# Patient Record
Sex: MALE | Race: WHITE | NOT HISPANIC OR LATINO | Employment: OTHER | URBAN - METROPOLITAN AREA
[De-identification: names, ages, dates, MRNs, and addresses within clinical notes are randomized per-mention and may not be internally consistent; named-entity substitution may affect disease eponyms.]

---

## 2018-09-11 DIAGNOSIS — I65.23 CAROTID STENOSIS, BILATERAL: Primary | ICD-10-CM

## 2018-09-17 ENCOUNTER — OFFICE VISIT (OUTPATIENT)
Dept: CARDIAC SURGERY | Facility: CLINIC | Age: 78
End: 2018-09-17

## 2018-09-17 VITALS
HEIGHT: 68 IN | TEMPERATURE: 98 F | BODY MASS INDEX: 30.8 KG/M2 | OXYGEN SATURATION: 98 % | HEART RATE: 65 BPM | SYSTOLIC BLOOD PRESSURE: 145 MMHG | DIASTOLIC BLOOD PRESSURE: 78 MMHG | WEIGHT: 203.2 LBS

## 2018-09-17 DIAGNOSIS — I72.8 ANEURYSM OF OTHER SPECIFIED ARTERIES (CODE): Primary | ICD-10-CM

## 2018-09-17 PROCEDURE — 99205 OFFICE O/P NEW HI 60 MIN: CPT | Performed by: THORACIC SURGERY (CARDIOTHORACIC VASCULAR SURGERY)

## 2018-09-17 RX ORDER — LEVOTHYROXINE SODIUM 0.05 MG/1
50 TABLET ORAL DAILY
COMMUNITY

## 2018-09-17 NOTE — PROGRESS NOTES
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  He had about 65% right internal carotid artery stenosis.  He denies any chest pain or neck pain.  He is on aspirin daily.  He has been followed for his abdominal aneurysm in [place].  He denies any abdominal pain.  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     • GI bleeding     Colonic bleeding, 2014, requiring cauterization.   • Hiatal hernia    • History of blood transfusion    • Hyperlipidemia    • Hypertension    • Ischemic heart disease    • Rheumatoid arthritis (CMS/HCC)      Past Surgical History:   Procedure Laterality Date   • BACK SURGERY     • CORONARY ARTERY BYPASS GRAFT      x4   • ENDOSCOPY  12/14/2009   • INGUINAL HERNIA REPAIR     • PENILE PROSTHESIS IMPLANT         Current Outpatient Prescriptions:   •  ALPRAZolam (XANAX) 1 MG tablet, take 1/2-1 tablet by mouth once daily if needed, Disp: , Rfl: 0  •   aspirin 81 MG EC tablet, Take 81 mg by mouth Daily., Disp: , Rfl:   •  Cholecalciferol (VITAMIN D3) 5000 UNITS capsule capsule, Take 5,000 Units by mouth Daily., Disp: , Rfl:   •  Coenzyme Q10 (CO Q-10) 200 MG capsule, Take 200 mg by mouth Daily., Disp: , Rfl:   •  fluticasone (FLONASE) 50 MCG/ACT nasal spray, instill 1 to 2 sprays into each nostril once daily, Disp: , Rfl: 0  •  glipiZIDE (GLUCOTROL) 2.5 MG 24 hr tablet, 2.5 mg Daily., Disp: , Rfl:   •  HYDROcodone-acetaminophen (NORCO)  MG per tablet, 1 tablet 2 (Two) Times a Day., Disp: , Rfl: 0  •  isosorbide mononitrate (IMDUR) 30 MG 24 hr tablet, Take 1 tablet by mouth Daily., Disp: 30 tablet, Rfl: 11  •  levothyroxine (SYNTHROID, LEVOTHROID) 50 MCG tablet, Take 50 mcg by mouth Daily., Disp: , Rfl:   •  lisinopril (PRINIVIL,ZESTRIL) 5 MG tablet, 5 mg Daily., Disp: , Rfl: 0  •  metFORMIN (GLUCOPHAGE) 1000 MG tablet, 1,000 mg 2 (Two) Times a Day., Disp: , Rfl: 0  •  predniSONE (DELTASONE) 2.5 MG tablet, 2.5 mg Daily., Disp: , Rfl: 0  •  raNITIdine (ZANTAC) 300 MG tablet, 300 mg Daily., Disp: , Rfl: 0  •  vitamin B-12 (CYANOCOBALAMIN) 1000 MCG tablet, Take 1,000 mcg by mouth Daily., Disp: , Rfl:   •  azelastine (ASTELIN) 0.1 % nasal spray, 2 sprays 2 (Two) Times a Day., Disp: , Rfl: 0  •  Bioflavonoid Products (OMARI C PO), Take 500 mg by mouth Daily., Disp: , Rfl:   •  Magnesium Gluconate (MAGNESIUM 27 PO), Take  by mouth Daily., Disp: , Rfl:   •  montelukast (SINGULAIR) 10 MG tablet, 10 mg Daily., Disp: , Rfl: 0  •  nitroglycerin (NITROSTAT) 0.4 MG SL tablet, 1 under the tongue as needed for angina, may repeat q5mins for up three doses, Disp: 100 tablet, Rfl: 11  •  omeprazole (priLOSEC) 20 MG capsule, take 1 capsule by mouth once daily, Disp: , Rfl: 0  •  POTASSIUM GLUCONATE PO, Take  by mouth Daily., Disp: , Rfl:   •  Saw Palmetto, Serenoa repens, 450 MG capsule, Take  by mouth Daily., Disp: , Rfl:   Allergies   Allergen Reactions   • Coreg [Carvedilol]  Other (See Comments)     dysphagia/throat swelling.  PATIENT DENIES ALLERGY     Social History     Social History   • Marital status: Single     Spouse name: N/A   • Number of children: N/A   • Years of education: N/A     Occupational History   • Not on file.     Social History Main Topics   • Smoking status: Former Smoker     Packs/day: 1.50     Years: 25.00     Types: Cigarettes     Quit date: 1/1/1975   • Smokeless tobacco: Current User     Types: Snuff   • Alcohol use 1.8 - 3.6 oz/week     1 - 2 Glasses of wine, 1 - 2 Cans of beer, 1 - 2 Shots of liquor per week      Comment: monthly   • Drug use: No   • Sexual activity: Defer     Other Topics Concern   • Not on file     Social History Narrative   • No narrative on file     Family History   Problem Relation Age of Onset   • Diabetes Mother    • Heart disease Mother    • Emphysema Father      Review of Systems   Constitution: Negative for chills, fever, malaise/fatigue, night sweats and weight loss.   HENT: Negative for hearing loss, odynophagia and sore throat.    Cardiovascular: Negative for chest pain, dyspnea on exertion, leg swelling, orthopnea and palpitations.   Respiratory: Negative for cough, hemoptysis and shortness of breath.    Endocrine: Negative for cold intolerance, heat intolerance, polydipsia, polyphagia and polyuria.   Hematologic/Lymphatic: Does not bruise/bleed easily.   Skin: Negative for itching and rash.   Musculoskeletal: Negative for back pain, joint pain, joint swelling, muscle cramps, muscle weakness, myalgias and neck pain.   Gastrointestinal: Negative for abdominal pain, constipation, diarrhea, hematemesis, hematochezia, melena, nausea and vomiting.   Genitourinary: Negative for dysuria, frequency and hematuria.   Neurological: Positive for dizziness (some), light-headedness (some ) and loss of balance (some). Negative for focal weakness, headaches, numbness, paresthesias and seizures.   Psychiatric/Behavioral: Negative for depression  "and suicidal ideas. The patient is not nervous/anxious.    All other systems reviewed and are negative.    Vitals:    09/17/18 1342   BP: 145/78   BP Location: Right arm   Patient Position: Sitting   Pulse: 65   Temp: 98 °F (36.7 °C)   SpO2: 98%   Weight: 92.2 kg (203 lb 3.2 oz)   Height: 172.7 cm (68\")      Physical Exam   Constitutional: He is oriented to person, place, and time. He appears well-developed and well-nourished. No distress.   HENT:   Head: Normocephalic.   Eyes: Pupils are equal, round, and reactive to light. EOM are normal.   Neck: Normal range of motion. Carotid bruit is not present. No thyromegaly present.   Cardiovascular: Normal rate and regular rhythm.  Exam reveals no gallop and no friction rub.    No murmur heard.  Pulmonary/Chest: He has no wheezes. He has no rales.   Abdominal: Soft. Bowel sounds are normal. He exhibits no distension and no mass. There is no hepatomegaly. There is no tenderness.   Musculoskeletal: Normal range of motion. He exhibits no deformity.   Neurological: He is alert and oriented to person, place, and time. He has normal strength. No cranial nerve deficit or sensory deficit.   Skin: No bruising and no petechiae noted. No cyanosis. Nails show no clubbing.   Psychiatric: He has a normal mood and affect.       Labs/Imaging:  I have obtained and reviewed medical records from Ms. Padgett including the CTA of his neck and chest demonstrating approximately a 2 cm innominate artery aneurysm.     Assessment/Plan:   Mr. Quesada is an 78-year-old male who is referred for an approximately 2 cm innominate artery aneurysm.  This shows no evidence of dissection or thrombus or any other problem.  He only has a 65% stenosis of his right internal carotid artery, which I would treat conservatively.  He has had no history of TIA or CVA symptoms that I can elicit.  At this point, I would like to repeat his CT scan in one year and see him back also with carotid duplex.  I have answered all " of his questions.  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        Patient Active Problem List   Diagnosis   • Ischemic heart disease   • Hypertension   • Hyperlipidemia   • Diabetes mellitus (CMS/[facility])   • Rheumatoid arthritis (CMS/[facility])   • GERD (gastroesophageal reflux disease)   • Aneurysm of other specified arteries (CODE) (CMS/[facility])     CC: SOSA Rios MD Debbie Moore, , editing for Damaso Cleaning M.D.    I, Damaso Cleaning MD, have read and agree with the editing done by Staci Cast, .

## 2019-09-26 ENCOUNTER — TELEPHONE (OUTPATIENT)
Dept: CARDIAC SURGERY | Facility: CLINIC | Age: 79
End: 2019-09-26

## 2020-10-13 ENCOUNTER — TELEPHONE (OUTPATIENT)
Dept: CARDIAC SURGERY | Facility: CLINIC | Age: 80
End: 2020-10-13

## 2020-10-13 NOTE — TELEPHONE ENCOUNTER
Pt wife called regarding f/u with Dr. Cleaning. Verified insurance, address and updated phone number.

## 2020-10-20 DIAGNOSIS — I65.23 CAROTID STENOSIS, ASYMPTOMATIC, BILATERAL: ICD-10-CM

## 2020-10-20 DIAGNOSIS — I72.8 ANEURYSM OF OTHER SPECIFIED ARTERIES (HCC): Primary | ICD-10-CM

## 2020-11-23 DIAGNOSIS — I65.23 CAROTID STENOSIS, ASYMPTOMATIC, BILATERAL: ICD-10-CM

## 2020-11-25 ENCOUNTER — OFFICE VISIT (OUTPATIENT)
Dept: CARDIAC SURGERY | Facility: CLINIC | Age: 80
End: 2020-11-25

## 2020-11-25 VITALS
DIASTOLIC BLOOD PRESSURE: 82 MMHG | TEMPERATURE: 98.2 F | SYSTOLIC BLOOD PRESSURE: 138 MMHG | WEIGHT: 200 LBS | OXYGEN SATURATION: 95 % | BODY MASS INDEX: 30.31 KG/M2 | HEIGHT: 68 IN | HEART RATE: 76 BPM

## 2020-11-25 DIAGNOSIS — I65.23 CAROTID STENOSIS, ASYMPTOMATIC, BILATERAL: Primary | ICD-10-CM

## 2020-11-25 DIAGNOSIS — Q27.8: ICD-10-CM

## 2020-11-25 PROCEDURE — 99212 OFFICE O/P EST SF 10 MIN: CPT | Performed by: THORACIC SURGERY (CARDIOTHORACIC VASCULAR SURGERY)

## 2020-11-25 NOTE — PROGRESS NOTES
11/25/2020  Patient Information  Taiwo Quesada                                                                                          [address]  [facility] [address]   1940  'PCP/Referring Physician'  Alex Gallardo MD  [phone]  No ref. provider found    Chief Complaint   Patient presents with   • Carotid Artery Disease     Follow up after carotid us and CT chest    • Thoracic Aneurysm       History of Present Illness:  Mr. Quesada is an 80-year-old male who returns today for follow-up for carotid stenosis and a thoracic aneurysm. Since his last visit he has been feeling well. He has had no chest pain or difficulty. He denies any TIA or CVA symptoms. His carotid duplex looks satisfactory.    Patient Active Problem List   Diagnosis   • Ischemic heart disease   • Hypertension   • Hyperlipidemia   • Diabetes mellitus (CMS/HCC)   • Rheumatoid arthritis (CMS/HCC)   • GERD (gastroesophageal reflux disease)   • Aneurysm of other specified arteries (CODE)   • Carotid stenosis, asymptomatic, bilateral   • Innominate artery syndrome     Past Medical History:   Diagnosis Date   • Diabetes mellitus (CMS/HCC)    • GERD (gastroesophageal reflux disease)     EGD on 12/14/2009, revealing hiatal hernia.     • GI bleeding     Colonic bleeding, 2014, requiring cauterization.   • Hiatal hernia    • History of blood transfusion    • Hyperlipidemia    • Hypertension    • Ischemic heart disease    • Rheumatoid arthritis (CMS/HCC)      Past Surgical History:   Procedure Laterality Date   • BACK SURGERY     • CORONARY ARTERY BYPASS GRAFT      x4   • ENDOSCOPY  12/14/2009   • INGUINAL HERNIA REPAIR     • PENILE PROSTHESIS IMPLANT         Current Outpatient Medications:   •  aspirin 81 MG EC tablet, Take 81 mg by mouth Daily., Disp: , Rfl:   •  glipiZIDE (GLUCOTROL) 2.5 MG 24 hr tablet, 2.5 mg Daily., Disp: , Rfl:   •  HYDROcodone-acetaminophen (NORCO)  MG per tablet, 1 tablet 2 (Two) Times a Day., Disp:  , Rfl: 0  •  isosorbide mononitrate (IMDUR) 30 MG 24 hr tablet, Take 1 tablet by mouth Daily., Disp: 30 tablet, Rfl: 11  •  levothyroxine (SYNTHROID, LEVOTHROID) 50 MCG tablet, Take 50 mcg by mouth Daily., Disp: , Rfl:   •  lisinopril (PRINIVIL,ZESTRIL) 5 MG tablet, 5 mg Daily., Disp: , Rfl: 0  •  metFORMIN (GLUCOPHAGE) 1000 MG tablet, 1,000 mg 2 (Two) Times a Day., Disp: , Rfl: 0  •  omeprazole (priLOSEC) 20 MG capsule, take 1 capsule by mouth once daily, Disp: , Rfl: 0  •  predniSONE (DELTASONE) 2.5 MG tablet, 2.5 mg Daily., Disp: , Rfl: 0  •  ALPRAZolam (XANAX) 1 MG tablet, take 1/2-1 tablet by mouth once daily if needed, Disp: , Rfl: 0  •  azelastine (ASTELIN) 0.1 % nasal spray, 2 sprays 2 (Two) Times a Day., Disp: , Rfl: 0  •  Bioflavonoid Products (OMARI C PO), Take 500 mg by mouth Daily., Disp: , Rfl:   •  Cholecalciferol (VITAMIN D3) 5000 UNITS capsule capsule, Take 5,000 Units by mouth Daily., Disp: , Rfl:   •  Coenzyme Q10 (CO Q-10) 200 MG capsule, Take 200 mg by mouth Daily., Disp: , Rfl:   •  fluticasone (FLONASE) 50 MCG/ACT nasal spray, instill 1 to 2 sprays into each nostril once daily, Disp: , Rfl: 0  •  Magnesium Gluconate (MAGNESIUM 27 PO), Take  by mouth Daily., Disp: , Rfl:   •  montelukast (SINGULAIR) 10 MG tablet, 10 mg Daily., Disp: , Rfl: 0  •  nitroglycerin (NITROSTAT) 0.4 MG SL tablet, 1 under the tongue as needed for angina, may repeat q5mins for up three doses, Disp: 100 tablet, Rfl: 11  •  POTASSIUM GLUCONATE PO, Take  by mouth Daily., Disp: , Rfl:   •  raNITIdine (ZANTAC) 300 MG tablet, 300 mg Daily., Disp: , Rfl: 0  •  Saw Palmetto, Serenoa repens, 450 MG capsule, Take  by mouth Daily., Disp: , Rfl:   •  vitamin B-12 (CYANOCOBALAMIN) 1000 MCG tablet, Take 1,000 mcg by mouth Daily., Disp: , Rfl:   Allergies   Allergen Reactions   • Coreg [Carvedilol] Other (See Comments)     dysphagia/throat swelling.  PATIENT DENIES ALLERGY     Social History     Socioeconomic History   • Marital  "status: Single     Spouse name: Not on file   • Number of children: Not on file   • Years of education: Not on file   • Highest education level: Not on file   Tobacco Use   • Smoking status: Former Smoker     Packs/day: 1.50     Years: 25.00     Pack years: 37.50     Types: Cigarettes     Quit date: 1975     Years since quittin.9   • Smokeless tobacco: Current User     Types: Snuff   Substance and Sexual Activity   • Alcohol use: Yes     Alcohol/week: 3.0 - 6.0 standard drinks     Types: 1 - 2 Glasses of wine, 1 - 2 Cans of beer, 1 - 2 Shots of liquor per week     Comment: monthly   • Drug use: No   • Sexual activity: Defer     Family History   Problem Relation Age of Onset   • Diabetes Mother    • Heart disease Mother    • Emphysema Father      Review of Systems   Constitution: Negative for chills, fever, malaise/fatigue, night sweats and weight loss.   HENT: Negative for hearing loss, odynophagia and sore throat.    Cardiovascular: Negative for chest pain, dyspnea on exertion, leg swelling, orthopnea and palpitations.   Respiratory: Negative for cough and hemoptysis.    Endocrine: Negative for cold intolerance, heat intolerance, polydipsia, polyphagia and polyuria.   Hematologic/Lymphatic: Does not bruise/bleed easily.   Skin: Negative for itching and rash.   Musculoskeletal: Negative for joint pain, joint swelling and myalgias.   Gastrointestinal: Negative for abdominal pain, constipation, diarrhea, hematemesis, hematochezia, melena, nausea and vomiting.   Genitourinary: Negative for dysuria, frequency and hematuria.   Neurological: Negative for focal weakness, headaches, numbness and seizures.   Psychiatric/Behavioral: Negative for suicidal ideas.   All other systems reviewed and are negative.    Vitals:    20 1127   BP: 138/82   BP Location: Right arm   Patient Position: Sitting   Pulse: 76   Temp: 98.2 °F (36.8 °C)   SpO2: 95%   Weight: 90.7 kg (200 lb)   Height: 172.7 cm (68\")      Physical " Exam   CONSTITUTIONAL:  Oriented x3, well-nourished, well developed, in no acute distress.  EYES:    Eyes anicteric.  EOMI.  PERRLA.   ENT:                Good dentition.  NECK:    Supple without masses or thyromegaly.  LUNGS:           Decreased in the bases; no wheezing, rales or rhonchi.  CARDIOVASCULAR:  Regular rate and rhythm without murmur, rub or gallop.  There are no carotid bruits.  GI:     Abdomen is soft, nontender, nondistended with normal bowel sounds.  No hepatosplenomegaly and no masses.  EXTREMITIES:   No cyanosis, clubbing or edema.  SKIN:   No ulcerations, petechiae or bruising.  MUSCULOSKELETAL:  Full range of motion with no deformity of extremities.  NEURO:  Cranial nerves II-XII, motor and sensory exams are intact.  PSYCH:  Alert and oriented; mood and affect good.      Assessment/Plan:  Mr. Quesada returns today for follow-up of carotid stenosis and a thoracic aneurysm. His ultrasound reveals no abdominal aneurysm and his innominate artery appears to be stable. His carotid duplex looks satisfactory. We will see him back in 1 year with a carotid duplex.     Patient Active Problem List   Diagnosis   • Ischemic heart disease   • Hypertension   • Hyperlipidemia   • Diabetes mellitus (CMS/HCC)   • Rheumatoid arthritis (CMS/HCC)   • GERD (gastroesophageal reflux disease)   • Aneurysm of other specified arteries (CODE)   • Carotid stenosis, asymptomatic, bilateral   • Innominate artery syndrome     CC: MD Juana Murphy editing for Damaso Cleaning MD    I, Damaso Cleaning MD, have read and agree with the editing done by Juana Worrell, .

## 2020-11-30 PROBLEM — Q27.8: Status: ACTIVE | Noted: 2020-11-30

## 2020-11-30 PROBLEM — I65.23 CAROTID STENOSIS, ASYMPTOMATIC, BILATERAL: Status: ACTIVE | Noted: 2020-11-30

## 2021-10-25 ENCOUNTER — TELEPHONE (OUTPATIENT)
Dept: CARDIAC SURGERY | Facility: CLINIC | Age: 81
End: 2021-10-25

## 2021-10-25 DIAGNOSIS — R42 DIZZINESS: Primary | ICD-10-CM
